# Patient Record
Sex: FEMALE | Race: WHITE | NOT HISPANIC OR LATINO | URBAN - METROPOLITAN AREA
[De-identification: names, ages, dates, MRNs, and addresses within clinical notes are randomized per-mention and may not be internally consistent; named-entity substitution may affect disease eponyms.]

---

## 2022-01-10 ENCOUNTER — APPOINTMENT (RX ONLY)
Dept: URBAN - METROPOLITAN AREA CLINIC 26 | Facility: CLINIC | Age: 37
Setting detail: DERMATOLOGY
End: 2022-01-10

## 2022-01-10 DIAGNOSIS — L20.89 OTHER ATOPIC DERMATITIS: ICD-10-CM

## 2022-01-10 PROCEDURE — ? ADDITIONAL NOTES

## 2022-01-10 PROCEDURE — ? PHOTO-DOCUMENTATION

## 2022-01-10 PROCEDURE — ? TREATMENT REGIMEN

## 2022-01-10 PROCEDURE — ? COUNSELING

## 2022-01-10 PROCEDURE — ? GENTLE SKIN CARE INSTRUCTIONS

## 2022-01-10 PROCEDURE — ? PRESCRIPTION

## 2022-01-10 PROCEDURE — 99204 OFFICE O/P NEW MOD 45 MIN: CPT

## 2022-01-10 RX ORDER — BETAMETHASONE DIPROPIONATE 0.5 MG/G
CREAM TOPICAL
Qty: 45 | Refills: 5 | Status: ERX | COMMUNITY
Start: 2022-01-10

## 2022-01-10 RX ADMIN — BETAMETHASONE DIPROPIONATE: 0.5 CREAM TOPICAL at 00:00

## 2022-01-10 ASSESSMENT — LOCATION DETAILED DESCRIPTION DERM
LOCATION DETAILED: RIGHT ANTECUBITAL SKIN
LOCATION DETAILED: RIGHT POSTERIOR SHOULDER

## 2022-01-10 ASSESSMENT — LOCATION SIMPLE DESCRIPTION DERM
LOCATION SIMPLE: RIGHT UPPER ARM
LOCATION SIMPLE: RIGHT SHOULDER

## 2022-01-10 ASSESSMENT — LOCATION ZONE DERM: LOCATION ZONE: ARM

## 2022-01-10 NOTE — PROCEDURE: TREATMENT REGIMEN
Initiate Treatment: betamethasone dipropionate 0.05 % topical cream - Apply to areas of rash twice daily for up to two weeks per month. Do not use on face or body folds.
Detail Level: Zone
Samples Given: CeraVe moisturizing cream & anti itch, Eucerin anti itch & eczema relief

## 2025-06-03 ENCOUNTER — APPOINTMENT (OUTPATIENT)
Dept: URBAN - METROPOLITAN AREA CLINIC 23 | Facility: CLINIC | Age: 40
Setting detail: DERMATOLOGY
End: 2025-06-03

## 2025-06-03 DIAGNOSIS — L20.89 OTHER ATOPIC DERMATITIS: ICD-10-CM | Status: INADEQUATELY CONTROLLED

## 2025-06-03 PROCEDURE — ? PRESCRIPTION

## 2025-06-03 PROCEDURE — ? GENTLE SKIN CARE INSTRUCTIONS

## 2025-06-03 PROCEDURE — ? ADDITIONAL NOTES

## 2025-06-03 PROCEDURE — ? COUNSELING

## 2025-06-03 PROCEDURE — ? TREATMENT REGIMEN

## 2025-06-03 PROCEDURE — ? PHOTO-DOCUMENTATION

## 2025-06-03 RX ORDER — TACROLIMUS 1 MG/G
OINTMENT TOPICAL BID
Qty: 100 | Refills: 6 | Status: ERX | COMMUNITY
Start: 2025-06-03

## 2025-06-03 RX ORDER — TRIAMCINOLONE ACETONIDE 1 MG/G
OINTMENT TOPICAL BID
Qty: 454 | Refills: 3 | Status: ERX | COMMUNITY
Start: 2025-06-03

## 2025-06-03 RX ORDER — HYDROXYZINE HYDROCHLORIDE 10 MG/1
TABLET, FILM COATED ORAL QHS
Qty: 30 | Refills: 2 | Status: ERX | COMMUNITY
Start: 2025-06-03

## 2025-06-03 RX ADMIN — HYDROXYZINE HYDROCHLORIDE: 10 TABLET, FILM COATED ORAL at 00:00

## 2025-06-03 RX ADMIN — TACROLIMUS: 1 OINTMENT TOPICAL at 00:00

## 2025-06-03 RX ADMIN — TRIAMCINOLONE ACETONIDE: 1 OINTMENT TOPICAL at 00:00

## 2025-06-03 ASSESSMENT — LOCATION DETAILED DESCRIPTION DERM
LOCATION DETAILED: RIGHT POSTERIOR SHOULDER
LOCATION DETAILED: RIGHT ANTECUBITAL SKIN
LOCATION DETAILED: RIGHT DORSAL FOOT

## 2025-06-03 ASSESSMENT — LOCATION SIMPLE DESCRIPTION DERM
LOCATION SIMPLE: RIGHT FOOT
LOCATION SIMPLE: RIGHT SHOULDER
LOCATION SIMPLE: RIGHT UPPER ARM

## 2025-06-03 ASSESSMENT — LOCATION ZONE DERM
LOCATION ZONE: FEET
LOCATION ZONE: ARM

## 2025-06-03 ASSESSMENT — BSA ECZEMA: % BODY COVERED IN ECZEMA: 4

## 2025-06-03 NOTE — PROCEDURE: TREATMENT REGIMEN
Discontinue Regimen: betamethasone dipropionate 0.05 % topical cream - Apply to areas of rash twice daily for up to two weeks per month. Do not use on face or body folds.
Initiate Treatment: tacrolimus 0.1 % topical ointment BID: After 2 weeks of triamcinolone stop and switch to tacrolimus ointment TWICE daily for 2 weeks\\n\\ntriamcinolone acetonide 0.1 % topical ointment BID: Apply thin layer TWICE daily for 1-2 weeks as needed for scaly itchy rashes. USE FOR FLARES ONLY. Do not use over 2 weeks per month (2 weeks on, then stop for 2 weeks)\\n\\nhydroxyzine HCl 10 mg tablet QHS: Take one pill once daily at bedtime prn itch.
Detail Level: Zone
Samples Given: CeraVe moisturizing cream & anti itch, Eucerin anti itch & eczema relief

## 2025-07-02 ENCOUNTER — APPOINTMENT (OUTPATIENT)
Dept: URBAN - METROPOLITAN AREA CLINIC 23 | Facility: CLINIC | Age: 40
Setting detail: DERMATOLOGY
End: 2025-07-02

## 2025-07-02 DIAGNOSIS — L259 CONTACT DERMATITIS AND OTHER ECZEMA, UNSPECIFIED CAUSE: ICD-10-CM

## 2025-07-02 DIAGNOSIS — L20.89 OTHER ATOPIC DERMATITIS: ICD-10-CM | Status: IMPROVED

## 2025-07-02 PROBLEM — L23.9 ALLERGIC CONTACT DERMATITIS, UNSPECIFIED CAUSE: Status: ACTIVE | Noted: 2025-07-02

## 2025-07-02 PROCEDURE — ? PRESCRIPTION MEDICATION MANAGEMENT

## 2025-07-02 PROCEDURE — ? FULL BODY SKIN EXAM - DECLINED

## 2025-07-02 PROCEDURE — ? GENTLE SKIN CARE INSTRUCTIONS

## 2025-07-02 PROCEDURE — ? COUNSELING

## 2025-07-02 PROCEDURE — ? PRESCRIPTION

## 2025-07-02 PROCEDURE — ? TREATMENT GOALS

## 2025-07-02 RX ORDER — DESONIDE 0.5 MG/G
CREAM TOPICAL
Qty: 60 | Refills: 1 | Status: ERX | COMMUNITY
Start: 2025-07-02

## 2025-07-02 RX ADMIN — DESONIDE: 0.5 CREAM TOPICAL at 00:00

## 2025-07-02 ASSESSMENT — LOCATION SIMPLE DESCRIPTION DERM
LOCATION SIMPLE: RIGHT FOREHEAD
LOCATION SIMPLE: POSTERIOR NECK
LOCATION SIMPLE: RIGHT FOOT
LOCATION SIMPLE: RIGHT SHOULDER
LOCATION SIMPLE: NECK
LOCATION SIMPLE: RIGHT UPPER ARM
LOCATION SIMPLE: LEFT FOREHEAD

## 2025-07-02 ASSESSMENT — LOCATION DETAILED DESCRIPTION DERM
LOCATION DETAILED: LEFT CENTRAL LATERAL NECK
LOCATION DETAILED: RIGHT FOREHEAD
LOCATION DETAILED: RIGHT CENTRAL LATERAL NECK
LOCATION DETAILED: RIGHT ANTECUBITAL SKIN
LOCATION DETAILED: RIGHT DORSAL FOOT
LOCATION DETAILED: RIGHT POSTERIOR SHOULDER
LOCATION DETAILED: LEFT FOREHEAD
LOCATION DETAILED: MID POSTERIOR NECK

## 2025-07-02 ASSESSMENT — LOCATION ZONE DERM
LOCATION ZONE: NECK
LOCATION ZONE: FEET
LOCATION ZONE: ARM
LOCATION ZONE: FACE

## 2025-07-02 NOTE — PROCEDURE: PRESCRIPTION MEDICATION MANAGEMENT
Render In Strict Bullet Format?: No
Detail Level: Zone
Continue Regimen: tacrolimus 0.1 % topical ointment BID: After 2 weeks of triamcinolone stop and switch to tacrolimus ointment TWICE daily for 2 weeks\\n\\ntriamcinolone acetonide 0.1 % topical ointment BID: Apply thin layer TWICE daily for 1-2 weeks as needed for scaly itchy rashes. USE FOR FLARES ONLY. Do not use over 2 weeks per month (2 weeks on, then stop for 2 weeks)\\n\\nhydroxyzine HCl 10 mg tablet QHS: Take one pill once daily at bedtime prn itch.
Initiate Treatment: desonide 0.05 % topical cream \\nQuantity: 60.0 g  Days Supply: 30\\nSig: Apply thin layer TWICE daily for 1-2 weeks to rash on forehead and neck until clear

## 2025-07-02 NOTE — HPI: RASH
What Type Of Note Output Would You Prefer (Optional)?: Standard Output
Is The Patient Presenting As Previously Scheduled?: Yes
Is This A New Presentation, Or A Follow-Up?: Rash
Additional History: Patient used a bug repellent stick on [place]. She has been using this product forever, and suddenly got an itchy red rash on the face, neck, and around the hairline.